# Patient Record
Sex: MALE | Race: WHITE
[De-identification: names, ages, dates, MRNs, and addresses within clinical notes are randomized per-mention and may not be internally consistent; named-entity substitution may affect disease eponyms.]

---

## 2019-09-19 ENCOUNTER — HOSPITAL ENCOUNTER (OUTPATIENT)
Dept: HOSPITAL 60 - LB.ED | Age: 44
Setting detail: OBSERVATION
LOS: 1 days | Discharge: HOME | End: 2019-09-20
Attending: FAMILY MEDICINE | Admitting: FAMILY MEDICINE
Payer: MEDICARE

## 2019-09-19 DIAGNOSIS — Z79.899: ICD-10-CM

## 2019-09-19 DIAGNOSIS — K52.9: Primary | ICD-10-CM

## 2019-09-19 DIAGNOSIS — D72.829: ICD-10-CM

## 2019-09-19 PROCEDURE — 96365 THER/PROPH/DIAG IV INF INIT: CPT

## 2019-09-19 PROCEDURE — 99285 EMERGENCY DEPT VISIT HI MDM: CPT

## 2019-09-19 PROCEDURE — 96375 TX/PRO/DX INJ NEW DRUG ADDON: CPT

## 2019-09-19 PROCEDURE — 96376 TX/PRO/DX INJ SAME DRUG ADON: CPT

## 2019-09-19 PROCEDURE — 85025 COMPLETE CBC W/AUTO DIFF WBC: CPT

## 2019-09-19 PROCEDURE — G0378 HOSPITAL OBSERVATION PER HR: HCPCS

## 2019-09-19 PROCEDURE — 96366 THER/PROPH/DIAG IV INF ADDON: CPT

## 2019-09-19 PROCEDURE — 80053 COMPREHEN METABOLIC PANEL: CPT

## 2019-09-19 PROCEDURE — 36415 COLL VENOUS BLD VENIPUNCTURE: CPT

## 2019-09-19 PROCEDURE — 74018 RADEX ABDOMEN 1 VIEW: CPT

## 2019-09-19 PROCEDURE — 74176 CT ABD & PELVIS W/O CONTRAST: CPT

## 2019-09-19 PROCEDURE — 96367 TX/PROPH/DG ADDL SEQ IV INF: CPT

## 2019-09-19 RX ADMIN — METRONIDAZOLE SCH MLS/HR: 500 SOLUTION INTRAVENOUS at 07:36

## 2019-09-19 RX ADMIN — METRONIDAZOLE SCH MLS/HR: 500 SOLUTION INTRAVENOUS at 23:38

## 2019-09-19 RX ADMIN — CIPROFLOXACIN SCH MLS/HR: 2 INJECTION, SOLUTION INTRAVENOUS at 08:42

## 2019-09-19 RX ADMIN — CIPROFLOXACIN SCH MLS/HR: 2 INJECTION, SOLUTION INTRAVENOUS at 20:37

## 2019-09-19 RX ADMIN — METRONIDAZOLE SCH MLS/HR: 500 SOLUTION INTRAVENOUS at 16:56

## 2019-09-19 NOTE — CT
DATE OF SERVICE:  09/19/19

CLINICAL DATA:  abdominal pain



UNENHANCED ABDOMEN AND PELVIC CT:



Multislice acquisition through the abdomen and pelvis without IV or oral 
contrast was performed. 



Comparison is made to a prior unenhanced abdomen and pelvic CT dated 08/22/19.



There is a linear density in the left lung base consistent with linear 
atelectasis or fibrosis.  The lung bases are otherwise clear.  



The unenhanced liver appears normal.  No focal hepatic lesions.  The 
gallbladder appears normal.



The spleen appears normal.  The pancreas appears normal.  The right and left 
adrenals appear normal. 



The right and left kidneys appear normal.  No nephrocalcinosis or 
nephrolithiasis.  No hydronephrosis or hydroureter.



The bladder is partially fluid-filled.  It appears normal. 



The prostate is mildly enlarged.  



There is fluid with scattered air-fluid levels throughout the colon.  It is not 
distended.  Colitis should be considered.  



The stomach is gas and debris-filled and moderately distended.  The possibility 
of a gastric outlet obstruction should be considered. 



No free air.  No free fluid.  No dilated loops of bowel.  No adenopathy.  No 
aortic aneurysm.  



[id number]
MTDD

## 2019-09-19 NOTE — EDM.PDOC
ED HPI GENERAL MEDICAL PROBLEM





- General


Chief Complaint: Abdominal Pain


Stated Complaint: ABDOMINAL PAIN


Time Seen by Provider: 09/19/19 05:15


Source of Information: Reports: Patient


History Limitations: Reports: No Limitations





- History of Present Illness


INITIAL COMMENTS - FREE TEXT/NARRATIVE: 


Pt is a 43 year old male presents to emergency room with severe abdominal pain 

which started at 2 AM today. Pt has his supper at 9 PM and went to bed.He woke 

up with severe abdominal pain around 2 am today and noted that his abdomen was 

bloated, so severe that he felt like his left lower quadrant hernia mesh was 

pushed. Since then he claims he has been burping very foul smelling burps on 

and off and feels nauseous. No vomiting. Pain has resolved since then 

gradually. Presently not in pain.





No fever or chills. No heart burn, or dyspepsia.





Pt claims that he has been fighting constipation for past 1 month now and he 

had taken bottle of  Mag citrate last night.





Onset: Today


Onset Date: 09/19/19


Onset Time: 02:00


Location: Reports: Abdomen


Quality: Reports: Ache


Severity: Moderate


Improves with: Reports: None


Worsens with: Reports: None


Associated Symptoms: Denies: Confusion, Chest Pain, Cough, Diaphoresis, Fever/

Chills, Headaches, Nausea/Vomiting, Rash, Seizure, Shortness of Breath, Syncope

, Weakness





- Related Data


 Allergies











Allergy/AdvReac Type Severity Reaction Status Date / Time


 


adalimumab [From Humira] Allergy  Dizziness Verified 09/19/19 05:12


 


atenolol Allergy  Swelling Verified 09/19/19 05:12


 


Iodinated Contrast Media Allergy  Anaphylactic Verified 09/19/19 05:12





   Shock  


 


doxycycline AdvReac  Nausea Verified 09/19/19 05:12


 


rofecoxib [From Vioxx] AdvReac  Mouth Sores Verified 09/19/19 05:12











Home Meds: 


 Home Meds





Albuterol [Ventolin HFA] 1 puff INH Q4HR PRN 12/03/16 [History]


Fluticasone Propionate 1 spray ROCKY DAILY PRN 12/03/16 [History]


Amitriptyline [Elavil] 20 mg PO QPM 12/06/18 [History]


Fluconazole [Diflucan] 200 mg PO ASDIRECTED 12/06/18 [History]


Folic Acid 1 mg PO DAILY 12/06/18 [History]


Levothyroxine Sodium [Levoxyl] 50 mcg PO ASDIRECTED 12/06/18 [History]


Montelukast Sodium 10 mg PO DAILY 12/06/18 [History]


Acetaminophen/HYDROcodone [Norco 325-10 MG] 1 tab PO Q4H PRN  tablet 12/08/18 [

Rx]


Cholecalciferol (Vitamin D3) [Vitamin D3] 1,000 unit PO DAILY 09/19/19 [History]


Cyanocobalamin (Vitamin B12) [Vitamin B12] 100 mcg PO DAILY 09/19/19 [History]


Diltiazem HCl [Dilt-XR] 120 mg PO DAILY 09/19/19 [History]


InFLIXimab [Remicade] 100 mg IV ASDIRECTED 09/19/19 [History]


Ketorolac [Toradol] 10 mg PO TID PRN 09/19/19 [History]


Methotrexate 0.6 ml IM WEEKLY 09/19/19 [History]


Omeprazole 40 mg PO DAILY 09/19/19 [History]


ZOLMitriptan [Zolmitriptan] 5 mg PO ASDIRECTED PRN 09/19/19 [History]


diphenhydrAMINE HCl [Benadryl] 50 mg PO Q8H PRN 09/19/19 [History]











Past Medical History


HEENT History: Reports: Impaired Vision, Sinusitis


Cardiovascular History: Reports: Afib, Other (See Below)


Other Cardiovascular History: Linx through [facility]


Respiratory History: Reports: Asthma, Pneumonia, Recurrent, SOB


Gastrointestinal History: Reports: GERD, Other (See Below)


Other Gastrointestinal History: hernia


Musculoskeletal History: Reports: Fracture


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  

fractured right orbital , shattered sinus cavity, broken ribs


Neurological History: Reports: Concussion, Migraines


Endocrine/Metabolic History: Reports: Hypothyroidism, Vitamin D Deficiency


Other Endocrine/Metabolic History: partial thyroidectomy due to cancer


Oncologic (Cancer) History: Reports: None, Thyroid, Other (See Below)


Other Oncologic History: knee


Dermatologic History: Reports: Other (See Below)


Other Dermatologic History: rash chronic





- Infectious Disease History


Infectious Disease History: Reports: Chicken Pox





- Past Surgical History


HEENT Surgical History: Reports: Adenoidectomy, Tonsillectomy, Other (See Below)


Other HEENT Surgeries/Procedures: [place]


Cardiovascular Surgical History: Reports: None


GI Surgical History: Reports: Hernia, Inguinal, Other (See Below)


Other GI Surgeries/Procedures: Ulcerative colitis


Endocrine Surgical History: Reports: Thyroidectomy


Neurological Surgical History: Reports: None


Musculoskeletal Surgical History: Reports: Other (See Below)


Other Musculoskeletal Surgeries/Procedures:: tumers removed from growth plate 

left knee


Oncologic Surgical History: Reports: None





Social & Family History





- Family History


Family Medical History: Noncontributory





- Caffeine Use


Caffeine Use: Reports: Coffee





ED ROS GENERAL





- Review of Systems


Review Of Systems: See Below


Constitutional: Denies: Fever, Chills, Weakness


HEENT: Denies: Ear Pain, Rhinitis, Throat Pain


Respiratory: Denies: Shortness of Breath, Cough, Sputum


Cardiovascular: Denies: Chest Pain, Lightheadedness


GI/Abdominal: Reports: Abdominal Pain, Constipation, Distension, Nausea.  Denies

: Black Stool, Hematemesis, Hematochezia, Vomiting


: Denies: Dysuria, Flank Pain, Frequency


Musculoskeletal: Denies: Joint Pain, Joint Swelling


Skin: Denies: Bruising, Pruritis, Rash





ED EXAM, GENERAL





- Physical Exam


Exam: See Below


Exam Limited By: No Limitations


General Appearance: Alert, WD/WN, No Apparent Distress


Eye Exam: Bilateral Eye: EOMI, PERRL


Ears: Normal External Exam, Normal Canal, Hearing Grossly Normal, Normal TMs


Ear Exam: Bilateral Ear: Auricle Normal, Canal Normal, TM normal


Nose: Normal Inspection, Normal Mucosa, No Blood


Throat/Mouth: Normal Inspection, Normal Lips, Normal Teeth, Normal Gums, Normal 

Oropharynx, Normal Voice, No Airway Compromise


Head: Atraumatic, Normocephalic


Neck: Normal Inspection, Supple, Non-Tender, Full Range of Motion


Respiratory/Chest: No Respiratory Distress, Lungs Clear, Normal Breath Sounds, 

No Accessory Muscle Use, Chest Non-Tender


Cardiovascular: Normal Peripheral Pulses, Regular Rate, Rhythm, No Edema, No 

Gallop, No JVD, No Murmur, No Rub


GI/Abdominal: Non-Tender, Distended (in the upper abdomen), Abnormal Bowel 

Sounds (hypoactive BS).  No: Guarding, Rigid, Rebound, Mass


Back Exam: Normal Inspection, Full Range of Motion, NT


Extremities: Normal Inspection, Normal Range of Motion, Non-Tender, Normal 

Capillary Refill, No Pedal Edema


Neurological: Alert, Oriented, CN II-XII Intact, Normal Cognition, Normal Gait, 

Normal Reflexes, No Motor/Sensory Deficits





Course





- Vital Signs


Text/Narrative:: 


In the emergency room, patient is asymptomatic. His Vitals are stable. On exam, 

there is upper abdominal distension.Abdomen is non-tender presently. No LLQ 

tenderness.  He does have slightly hypoactive bowel sounds. I did get CBC, CMP 

and CT abdomen without contrast to rule out bowel obstruction V/s 

diverticulitis.





Pt's CBC is elevated at 19.6 with 89% neutrophils. His CMP is normal. His CT 

abdomen shows prominent stomach with gastric retention of food content. Patient 

claims his last meal was at 9 PM. There is food still present in the stomach 

after 10 hrs. Also he claims he has been having foul smelling belches. He might 

have some gastric motility disorder too,but, this might be new or non-specific.





CT shows possible gastroenteritis with colonic air fluid levels,  OR might have 

early colonic obstruction too. Cannot rule out. Hence I have admitted patient 

to hospital for observation. Will start him on cipro and flagyl IV as his white 

count is elevated.  He did receive reglan 10mg. Did try to place NG tube to 

decompress stomach, but patient has had DNS and attempt failed. As patient is 

asymptomatic, will keep him NPO.





Will monitor him in the hospital  over the day. Repeat Abdominal X-ray upright 

and CBC in the evening. If the air fluid levels have resolved and if he is 

feeling better, will discharge him today evening.





Last Recorded V/S: 


 Last Vital Signs











Temp  98.2 F   09/19/19 05:14


 


Pulse  83   09/19/19 05:14


 


Resp  16   09/19/19 05:14


 


BP  135/99 H  09/19/19 05:14


 


Pulse Ox  98   09/19/19 05:14














- Orders/Labs/Meds


Orders: 


 Active Orders 24 hr











 Category Date Time Status


 


 Patient Status [ADT] Routine ADT  09/19/19 06:52 Ordered


 


 Height and Weight [RC] UPON Care  09/19/19 06:52 Ordered


 


 Intake and Output [RC] QSHIFT Care  09/19/19 06:53 Ordered


 


 Oxygen Therapy [RC] PRN Care  09/19/19 06:52 Ordered


 


 VTE/DVT Education [RC] Per Unit Routine Care  09/19/19 06:52 Ordered


 


 Vital Signs [RC] Q4H Care  09/19/19 06:52 Ordered


 


 Nothing per Oral Now Diet [DIET] Diet  09/19/19 Breakfast Ordered


 


 Abdomen Pelvis wo Cont [CT] Stat Exams  09/19/19 05:36 Taken


 


 CBC WITH AUTO DIFF [HEME] Routine Lab  09/19/19 16:52 Ordered


 


 Ciprofloxacin in D5W [Cipro in D5W 400 MG/200 ML] 400 Med  09/19/19 08:00 

Ordered





 mg   





 Premix Bag 1 bag   





 IV Q12HR   


 


 Metoclopramide [Reglan] Med  09/19/19 07:00 Once





 10 mg IV ONETIME ONE   


 


 Sodium Chloride 0.9% @ 125 MLS/HR (1000ml) Med  09/19/19 07:00 Ordered





 Sodium Chloride 0.9% [Normal Saline] 1,000 ml   





 IV ASDIRECTED   


 


 Sodium Chloride 0.9% [Saline Flush] Med  09/19/19 06:52 Ordered





 10 ml FLUSH ASDIRECTED PRN   


 


 metroNIDAZOLE/Normal Saline [Flagyl 500 MG in  ML Med  09/19/19 07:00 

Ordered





 ] 500 mg   





 Premix Bag 1 bag   





 IV Q8H   


 


 Peripheral IV Insertion Adult [OM.PC] Routine Oth  09/19/19 06:52 Ordered


 


 Resuscitation Status Routine Resus Stat  09/19/19 06:52 Ordered











Labs: 


 Laboratory Tests











  09/19/19 09/19/19 Range/Units





  05:45 05:45 


 


WBC  19.7 H D   (4.0-11.0)  K/uL


 


RBC  4.95   (4.50-6.50)  M/uL


 


Hgb  15.2   (13.0-18.0)  g/dL


 


Hct  44.4   (40.0-54.0)  %


 


MCV  90   (76-96)  fL


 


MCH  30.7   (27.0-32.0)  pg


 


MCHC  34.2   (31.0-35.0)  g/dL


 


RDW  14.0   (11.0-16.0)  %


 


Plt Count  274   (150-400)  K/uL


 


MPV  10.6 H   (6.0-10.0)  fL


 


Neut % (Auto)  89.0 H   (45.0-70.0)  %


 


Lymph % (Auto)  5.2 L   (20.0-40.0)  %


 


Mono % (Auto)  5.7   (3.0-10.0)  %


 


Eos % (Auto)  0.0 L   (1.0-5.0)  %


 


Baso % (Auto)  0.1   (0.0-0.5)  %


 


Neut # (Auto)  17.54 H   (2.00-7.50)  K/uL


 


Lymph # (Auto)  1.02 L   (1.50-4.00)  K/uL


 


Mono # (Auto)  1.12 H   (0.20-0.80)  K/uL


 


Eos # (Auto)  0.00 L   (0.04-0.40)  K/uL


 


Baso # (Auto)  0.01 L   (0.02-0.10)  K/uL


 


Sodium   143  (136-145)  mmol/L


 


Potassium   3.7  (3.5-5.1)  mmol/L


 


Chloride   108 H  ()  mmol/L


 


Carbon Dioxide   23.7  D  (21.0-32.0)  mmol/L


 


Anion Gap   15.0  (5.0-15.0)  mmol/L


 


BUN   20  D  (8-26)  mg/dL


 


Creatinine   0.87  (0.70-1.30)  mg/dL


 


Est Cr Clr Drug Dosing   TNP  


 


Estimated GFR (MDRD)   > 60  (>60)  MLS/MIN


 


BUN/Creatinine Ratio   23.0  (6-25)  


 


Glucose   127 H  ()  mg/dL


 


Calcium   8.6  (8.5-10.1)  mg/dL


 


Total Bilirubin   0.5  (0.0-1.0)  mg/dL


 


AST   12 L  (15-37)  U/L


 


ALT   22  (12-78)  U/L


 


Alkaline Phosphatase   60  ()  U/L


 


Total Protein   6.1 L  (6.4-8.2)  g/dL


 


Albumin   3.4  (3.4-5.0)  g/dL


 


Globulin   2.7  (2.2-4.2)  g/dL


 


Albumin/Globulin Ratio   1.3  (0.8-2.0)  














Departure





- Departure


Time of Disposition: 07:00


Disposition: Refer to Observation


Condition: Fair


Clinical Impression: 


 Leucocytosis, Gastroenteritis








- Discharge Information


*PRESCRIPTION DRUG MONITORING PROGRAM REVIEWED*: Not Applicable


*COPY OF PRESCRIPTION DRUG MONITORING REPORT IN PATIENT TONI: Not Applicable


Referrals: 


PCP,None [Primary Care Provider] - 


Forms:  ED Department Discharge, ED Return to Work/School Form





- Problem List & Annotations


(1) Gastroenteritis


SNOMED Code(s): 98202337


   Code(s): K52.9 - NONINFECTIVE GASTROENTERITIS AND COLITIS, UNSPECIFIED   

Status: Acute   Current Visit: Yes   





(2) Leucocytosis


SNOMED Code(s): 920022406, 012552140


   Code(s): D72.829 - ELEVATED WHITE BLOOD CELL COUNT, UNSPECIFIED   Status: 

Acute   Current Visit: Yes   





- Problem List Review


Problem List Initiated/Reviewed/Updated: Yes





- My Orders


Last 24 Hours: 


My Active Orders





09/19/19 05:36


Abdomen Pelvis wo Cont [CT] Stat 





09/19/19 06:52


Patient Status [ADT] Routine 


Height and Weight [RC] UPON 


Oxygen Therapy [RC] PRN 


VTE/DVT Education [RC] Per Unit Routine 


Vital Signs [RC] Q4H 


Sodium Chloride 0.9% [Saline Flush]   10 ml FLUSH ASDIRECTED PRN 


Peripheral IV Insertion Adult [OM.PC] Routine 


Resuscitation Status Routine 





09/19/19 06:53


Intake and Output [RC] QSHIFT 





09/19/19 07:00


Metoclopramide [Reglan]   10 mg IV ONETIME ONE 


Sodium Chloride 0.9% @ 125 MLS/HR (1000ml) Sodium Chloride 0.9% [Normal Saline] 

1,000 ml IV ASDIRECTED 


metroNIDAZOLE/Normal Saline [Flagyl 500 MG in  ML] 500 mg   Premix Bag 1 

bag IV Q8H 





09/19/19 08:00


Ciprofloxacin in D5W [Cipro in D5W 400 MG/200 ML] 400 mg   Premix Bag 1 bag IV 

Q12HR 





09/19/19 16:52


CBC WITH AUTO DIFF [HEME] Routine 





09/19/19 Breakfast


Nothing per Oral Now Diet [DIET] 














- Assessment/Plan


Last 24 Hours: 


My Active Orders





09/19/19 05:36


Abdomen Pelvis wo Cont [CT] Stat 





09/19/19 06:52


Patient Status [ADT] Routine 


Height and Weight [RC] UPON 


Oxygen Therapy [RC] PRN 


VTE/DVT Education [RC] Per Unit Routine 


Vital Signs [RC] Q4H 


Sodium Chloride 0.9% [Saline Flush]   10 ml FLUSH ASDIRECTED PRN 


Peripheral IV Insertion Adult [OM.PC] Routine 


Resuscitation Status Routine 





09/19/19 06:53


Intake and Output [RC] QSHIFT 





09/19/19 07:00


Metoclopramide [Reglan]   10 mg IV ONETIME ONE 


Sodium Chloride 0.9% @ 125 MLS/HR (1000ml) Sodium Chloride 0.9% [Normal Saline] 

1,000 ml IV ASDIRECTED 


metroNIDAZOLE/Normal Saline [Flagyl 500 MG in  ML] 500 mg   Premix Bag 1 

bag IV Q8H 





09/19/19 08:00


Ciprofloxacin in D5W [Cipro in D5W 400 MG/200 ML] 400 mg   Premix Bag 1 bag IV 

Q12HR 





09/19/19 16:52


CBC WITH AUTO DIFF [HEME] Routine 





09/19/19 Breakfast


Nothing per Oral Now Diet [DIET] 











Assessment:: 


Acute abdominal pain


Gastroenteritis with leucocytosis





Plan: 


In the emergency room, patient is asymptomatic. His Vitals are stable. On exam, 

there is upper abdominal distension.Abdomen is non-tender presently. No LLQ 

tenderness.  He does have slightly hypoactive bowel sounds. I did get CBC, CMP 

and CT abdomen without contrast to rule out bowel obstruction V/s 

diverticulitis.





Pt's CBC is elevated at 19.6 with 89% neutrophils. His CMP is normal. His CT 

abdomen shows prominent stomach with gastric retention of food content. Patient 

claims his last meal was at 9 PM. There is food still present in the stomach 

after 10 hrs. Also he claims he has been having foul smelling belches. He might 

have some gastric motility disorder too,but, this might be new or non-specific.





CT shows possible gastroenteritis with colonic air fluid levels,  OR might have 

early colonic obstruction too. Cannot rule out. Hence I have admitted patient 

to hospital for observation. Will start him on cipro and flagyl IV as his white 

count is elevated.  He did receive reglan 10mg. Did try to place NG tube to 

decompress stomach, but patient has had DNS and attempt failed. As patient is 

asymptomatic, will keep him NPO.





Will monitor him in the hospital  over the day. Repeat Abdominal X-ray upright 

and CBC in the evening. If the air fluid levels have resolved and if he is 

feeling better, will discharge him today evening.

## 2019-09-19 NOTE — PCM.DCSUM1
**Discharge Summary





- Hospital Course


Free Text/Narrative:: 


Pt presented with acute upper abdominal pain with distension. His Workup showed 

gastroenteritis with air fluid level in the colon, with leucocytosis of 19.6 K. 

Hence patient was admitted to monitor closely t make sure he was developing 

bowel obstruction or acute abdominal infection.





Pt was kept NPO and started in IV NS at 125cc/hr. Pt has had 3 episodes of 

small loose watery stools. He claims he is feeling better. No more nausea or 

vomiting since admission. HE is feeling hungry.





The repeat Abdominal Xray upright is negative for air fluids levels. His CBC 

shows white count of .





Pt reassured that he basically has gastroenteritis. His IV fluids have been 

discontinued. Discharged home of oral cipro 500mg BID and flagyl 500mg BID for 

1 wk. Advised soft diet. Avoid meat and mild products until loose stools 

resolve.





Brief History: Pt presented to emergency room with sudden onset of severe upper 

abdominal pain. Pt was admitted for close monitoring of his symptoms. Kindly 

see H&P for details.


Diagnosis: Stroke: No





- Discharge Data


Discharge Date: 09/19/19


Discharge Disposition: Home, Self-Care 01


Condition: Good





- Referral to Home Health


Primary Care Physician: 


PCP None








- Discharge Diagnosis/Problem(s)


(1) Gastroenteritis


SNOMED Code(s): 52578945


   ICD Code: K52.9 - NONINFECTIVE GASTROENTERITIS AND COLITIS, UNSPECIFIED   

Status: Acute   Current Visit: Yes   





(2) Leucocytosis


SNOMED Code(s): 868983191, 480343962


   ICD Code: D72.829 - ELEVATED WHITE BLOOD CELL COUNT, UNSPECIFIED   Status: 

Acute   Current Visit: Yes   





- Patient Instructions


Diet: GI Soft/Low Residue/Low Fiber


Fluid Restriction: 1500 mL


Activity: As Tolerated


Driving: [address] Today


Showering/Bathing: May Shower





- Discharge Plan


*PRESCRIPTION DRUG MONITORING PROGRAM REVIEWED*: Not Applicable


*COPY OF PRESCRIPTION DRUG MONITORING REPORT IN PATIENT TONI: Not Applicable


Home Medications: 


 Home Meds





Albuterol [Ventolin HFA] 1 puff INH Q4HR PRN 12/03/16 [History]


Fluticasone Propionate 1 spray ROCKY DAILY PRN 12/03/16 [History]


Amitriptyline [Elavil] 20 mg PO QPM 12/06/18 [History]


Fluconazole [Diflucan] 200 mg PO ASDIRECTED 12/06/18 [History]


Folic Acid 1 mg PO DAILY 12/06/18 [History]


Levothyroxine Sodium [Levoxyl] 50 mcg PO ASDIRECTED 12/06/18 [History]


Montelukast Sodium 10 mg PO DAILY 12/06/18 [History]


Acetaminophen/HYDROcodone [Norco 325-10 MG] 1 tab PO Q4H PRN  tablet 12/08/18 [

Rx]


Cholecalciferol (Vitamin D3) [Vitamin D3] 1,000 unit PO DAILY 09/19/19 [History]


Cyanocobalamin (Vitamin B12) [Vitamin B12] 100 mcg PO DAILY 09/19/19 [History]


Diltiazem HCl [Dilt-XR] 120 mg PO DAILY 09/19/19 [History]


InFLIXimab [Remicade] 100 mg IV ASDIRECTED 09/19/19 [History]


Ketorolac [Toradol] 10 mg PO TID PRN 09/19/19 [History]


Methotrexate 0.6 ml IM WEEKLY 09/19/19 [History]


Omeprazole 40 mg PO DAILY 09/19/19 [History]


ZOLMitriptan [Zolmitriptan] 5 mg PO ASDIRECTED PRN 09/19/19 [History]


diphenhydrAMINE HCl [Benadryl] 50 mg PO Q8H PRN 09/19/19 [History]








Forms:  ED Department Discharge, ED Return to Work/School Form


Referrals: 


PCP,None [Primary Care Provider] - 





- Discharge Summary/Plan Comment


DC Time >30 min.: Yes


Discharge Summary/Plan Comment: 


Pt reassured that he basically has gastroenteritis.


 His IV fluids have been discontinued.


 Discharged home of oral cipro 500mg BID and flagyl 500mg BID for 1 wk.


 Advised soft diet.


 Avoid meat and mild products until loose stools resolve.


Followup with primary care provider early next week for recheck.











- General Info


Date of Service: 09/19/19


Functional Status: Reports: Pain Controlled, Tolerating Diet, Urinating





- Review of Systems


General: Denies: Fever, Weakness, Fatigue


HEENT: Denies: Sore Throat, Rhinitis


Pulmonary: Denies: Cough, Sputum


Cardiovascular: Denies: Chest Pain, Lightheadedness


Gastrointestinal: Reports: Diarrhea.  Denies: Abdominal Pain, Constipation, 

Nausea, Vomiting


Genitourinary: Denies: Frequency, Pain


Musculoskeletal: Denies: Joint Pain, Joint Swelling


Skin: Denies: Bruising, Pruritis, Rash





- Patient Data


Vitals - Most Recent: 


 Last Vital Signs











Temp  98.6 F   09/19/19 14:52


 


Pulse  72   09/19/19 14:52


 


Resp  16   09/19/19 14:52


 


BP  134/83   09/19/19 14:52


 


Pulse Ox  100   09/19/19 14:52











Weight - Most Recent: 74.571 kg


Lab Results - Last 24 hrs: 


 Laboratory Results - last 24 hr











  09/19/19 09/19/19 Range/Units





  05:45 05:45 


 


WBC  19.7 H D   (4.0-11.0)  K/uL


 


RBC  4.95   (4.50-6.50)  M/uL


 


Hgb  15.2   (13.0-18.0)  g/dL


 


Hct  44.4   (40.0-54.0)  %


 


MCV  90   (76-96)  fL


 


MCH  30.7   (27.0-32.0)  pg


 


MCHC  34.2   (31.0-35.0)  g/dL


 


RDW  14.0   (11.0-16.0)  %


 


Plt Count  274   (150-400)  K/uL


 


MPV  10.6 H   (6.0-10.0)  fL


 


Neut % (Auto)  89.0 H   (45.0-70.0)  %


 


Lymph % (Auto)  5.2 L   (20.0-40.0)  %


 


Mono % (Auto)  5.7   (3.0-10.0)  %


 


Eos % (Auto)  0.0 L   (1.0-5.0)  %


 


Baso % (Auto)  0.1   (0.0-0.5)  %


 


Neut # (Auto)  17.54 H   (2.00-7.50)  K/uL


 


Lymph # (Auto)  1.02 L   (1.50-4.00)  K/uL


 


Mono # (Auto)  1.12 H   (0.20-0.80)  K/uL


 


Eos # (Auto)  0.00 L   (0.04-0.40)  K/uL


 


Baso # (Auto)  0.01 L   (0.02-0.10)  K/uL


 


Sodium   143  (136-145)  mmol/L


 


Potassium   3.7  (3.5-5.1)  mmol/L


 


Chloride   108 H  ()  mmol/L


 


Carbon Dioxide   23.7  D  (21.0-32.0)  mmol/L


 


Anion Gap   15.0  (5.0-15.0)  mmol/L


 


BUN   20  D  (8-26)  mg/dL


 


Creatinine   0.87  (0.70-1.30)  mg/dL


 


Est Cr Clr Drug Dosing   TNP  


 


Estimated GFR (MDRD)   > 60  (>60)  MLS/MIN


 


BUN/Creatinine Ratio   23.0  (6-25)  


 


Glucose   127 H  ()  mg/dL


 


Calcium   8.6  (8.5-10.1)  mg/dL


 


Total Bilirubin   0.5  (0.0-1.0)  mg/dL


 


AST   12 L  (15-37)  U/L


 


ALT   22  (12-78)  U/L


 


Alkaline Phosphatase   60  ()  U/L


 


Total Protein   6.1 L  (6.4-8.2)  g/dL


 


Albumin   3.4  (3.4-5.0)  g/dL


 


Globulin   2.7  (2.2-4.2)  g/dL


 


Albumin/Globulin Ratio   1.3  (0.8-2.0)  











Med Orders - Current: 


 Current Medications





Ciprofloxacin/Dextrose 400 mg/ (Premix)  200 mls @ 200 mls/hr IV Q12HR [facility]


   Last Admin: 09/19/19 08:42 Dose:  200 mls/hr


Metronidazole 500 mg/ Premix  100 mls @ 100 mls/hr IV Q8H [facility]


   Last Admin: 09/19/19 07:36 Dose:  100 mls/hr


Sodium Chloride (Normal Saline)  1,000 mls @ 125 mls/hr IV ASDIRECTED [facility]


Sodium Chloride (Saline Flush)  10 ml FLUSH ASDIRECTED PRN


   PRN Reason: Keep Vein Open





Discontinued Medications





Metronidazole (Flagyl 500 Mg In Ns 100 Ml) Confirm Administered Dose 100 mls @ 

as directed .ROUTE .[facility] ONE


   Stop: 09/19/19 07:21


Ciprofloxacin/Dextrose (Cipro In D5w 400 Mg/200 Ml) Confirm Administered Dose 

200 mls @ as directed .ROUTE .[facility]


   Stop: 09/19/19 07:21


Metoclopramide HCl (Reglan)  10 mg IV ONETIME ONE


   Stop: 09/19/19 07:01


   Last Admin: 09/19/19 07:36 Dose:  10 mg











- Exam


General: Reports: Alert, Oriented


HEENT: Reports: Pupils Equal, Pupils Reactive, EOMI, Mucous Membr. Moist/Pink


Neck: Reports: Supple


Lungs: Reports: Clear to Auscultation, Normal Respiratory Effort


Cardiovascular: Reports: Regular Rate, Regular Rhythm


GI/Abdominal Exam: Normal Bowel Sounds, Soft, Non-Tender, No Organomegaly, No 

Distention, No Abnormal Bruit, No Mass, Pelvis Stable


Back Exam: Reports: Normal Inspection, Full Range of Motion


Extremities: Normal Inspection, Normal Range of Motion, Non-Tender, No Pedal 

Edema, Normal Capillary Refill

## 2019-09-20 VITALS — SYSTOLIC BLOOD PRESSURE: 129 MMHG | HEART RATE: 59 BPM | DIASTOLIC BLOOD PRESSURE: 78 MMHG

## 2019-09-20 RX ADMIN — METRONIDAZOLE SCH MLS/HR: 500 SOLUTION INTRAVENOUS at 07:43

## 2019-09-20 RX ADMIN — CIPROFLOXACIN SCH: 2 INJECTION, SOLUTION INTRAVENOUS at 09:30

## 2019-09-20 NOTE — CR
DATE OF SERVICE:  09/19/19

CLINICAL DATA:  followup from morning CT



SUPINE AND UPRIGHT ABDOMEN:  



No free air.  



There are scattered air-fluid levels in the right colon and distal small bowel.
  Enterocolitis should be considered.  No distended loops of bowel. 



[id number]
MTDD

## 2019-09-20 NOTE — PCM.DCSUM1
**Discharge Summary





- Hospital Course


Free Text/Narrative:: 


Pt presented to emergency room on 9/19/19 early morning with abdominal pain. On 

workup his White count was elevated at 19K and also his CT abdomen showed 

colonic air fluids levels. As patient has elevated white count with distended 

abdomen and pain, was admitted for monitoring and to make sure he is not 

getting into early bowel obstruction.





Pt was kept NPO and started on IV NS at 125cc/hr. Also for his elevated white 

count, i did start him on cipro 500mg BID and flagyl 400mg TID IV.HE did 

receive reglan 10mg IV on admission as he had gastric retention of food 

material. Pt had uneventful day, and later half of the day had 3 bouts of 

semiformed stool. On the evening of day1 as he was feeing better, I did repeat 

his white count which was up from 19K to 24 K. Hence patient was continued on 

Iv antibiotics for 24 hrs.Also patient was started on clear liquids in the 

evening and monitored over night.








Day 2 : pt is feeling better. No abdominal pain. Clinical exam is normal. 

Vitals stable. His white count is down to 10K today. As patient has been 

tolerating oral diet and feeling better and his white count is back to normal, 

patient has been planned for discharge.





Advised flagyl 500mg BID and Cipro 500mg BID for next 6 days. Avoid mild and 

meat products until done with antibiotics. Plenty of fluids and rest.





Followup in clinic for recheck next week.





Brief History: Presented to ER with severe abdominal pain. His white count was 

elevated and has air fluid level in colon. Admitted for observation. Kindly see 

H&P for details.


Diagnosis: Stroke: No





- Discharge Data


Discharge Date: 09/20/19


Discharge Disposition: Home, Self-Care 01


Condition: Good





- Referral to Home Health


Primary Care Physician: 


PCP None








- Discharge Diagnosis/Problem(s)


(1) Gastroenteritis


SNOMED Code(s): 20627984


   ICD Code: K52.9 - NONINFECTIVE GASTROENTERITIS AND COLITIS, UNSPECIFIED   

Status: Acute   





(2) Leucocytosis


SNOMED Code(s): 239570269, [id number]


   ICD Code: D72.829 - ELEVATED WHITE BLOOD CELL COUNT, UNSPECIFIED   Status: 

Acute   





- Patient Instructions


Diet: GI Soft/Low Residue/Low Fiber


Fluid Restriction: 1500 mL


Activity: As Tolerated


Driving: [address] Today





- Discharge Plan


*PRESCRIPTION DRUG MONITORING PROGRAM REVIEWED*: Not Applicable


*COPY OF PRESCRIPTION DRUG MONITORING REPORT IN PATIENT TONI: Not Applicable


Home Medications: 


 Home Meds





Albuterol [Ventolin HFA] 1 puff INH Q4HR PRN 12/03/16 [History]


Fluticasone Propionate 1 spray ROCKY DAILY PRN 12/03/16 [History]


Amitriptyline [Elavil] 20 mg PO QPM 12/06/18 [History]


Fluconazole [Diflucan] 200 mg PO ASDIRECTED 12/06/18 [History]


Folic Acid 1 mg PO DAILY 12/06/18 [History]


Levothyroxine Sodium [Levoxyl] 50 mcg PO ASDIRECTED 12/06/18 [History]


Montelukast Sodium 10 mg PO DAILY 12/06/18 [History]


Acetaminophen/HYDROcodone [Norco 325-10 MG] 1 tab PO Q4H PRN  tablet 12/08/18 [

Rx]


Cholecalciferol (Vitamin D3) [Vitamin D3] 1,000 unit PO DAILY 09/19/19 [History]


Cyanocobalamin (Vitamin B12) [Vitamin B12] 100 mcg PO DAILY 09/19/19 [History]


Diltiazem HCl [Dilt-XR] 120 mg PO DAILY 09/19/19 [History]


InFLIXimab [Remicade] 100 mg IV ASDIRECTED 09/19/19 [History]


Ketorolac [Toradol] 10 mg PO TID PRN 09/19/19 [History]


Methotrexate 0.6 ml IM WEEKLY 09/19/19 [History]


Omeprazole 40 mg PO DAILY 09/19/19 [History]


ZOLMitriptan [Zolmitriptan] 5 mg PO ASDIRECTED PRN 09/19/19 [History]


diphenhydrAMINE HCl [Benadryl] 50 mg PO Q8H PRN 09/19/19 [History]








Patient Handouts:  Ciprofloxacin tablets, Metronidazole tablets or capsules


Forms:  ED Department Discharge, ED Return to Work/School Form


Referrals: 


PCP,None [Primary Care Provider] - 





- Discharge Summary/Plan Comment


DC Time >30 min.: Yes


Discharge Summary/Plan Comment: 


Advised flagyl 500mg BID and Cipro 500mg BID for next 6 days. Avoid mild and 

meat products until done with antibiotics. Plenty of fluids and rest.





Followup in clinic for recheck next week.











- General Info


Date of Service: 09/20/19


Functional Status: Reports: Pain Controlled, Tolerating Diet, Ambulating, 

Urinating





- Review of Systems


General: Denies: Fever, Weakness


HEENT: Denies: Sinus Congestion, Rhinitis


Pulmonary: Denies: Sputum, Hemoptysis


Cardiovascular: Denies: Chest Pain, Lightheadedness


Gastrointestinal: Denies: Abdominal Pain, Difficulty Swallowing, Nausea, 

Vomiting


Genitourinary: Denies: Dysuria, Frequency


Musculoskeletal: Denies: Joint Pain, Joint Swelling


Skin: Denies: Bruising, Pruritis, Rash


Neurological: Denies: Headache, Numbness


Psychiatric: Denies: Depression





- Patient Data


Vitals - Most Recent: 


 Last Vital Signs











Temp  98.1 F   09/20/19 08:00


 


Pulse  59 L  09/20/19 08:00


 


Resp  16   09/20/19 03:33


 


BP  129/78   09/20/19 08:00


 


Pulse Ox  98   09/20/19 08:00











Weight - Most Recent: 70.307 kg


I&O - Last 24 hours: 


 Intake & Output











 09/20/19 09/20/19 09/20/19





 06:59 14:59 22:59


 


Intake Total 2281  


 


Output Total 1950  


 


Balance 331  











Lab Results - Last 24 hrs: 


 Laboratory Results - last 24 hr











  09/19/19 09/20/19 Range/Units





  16:10 07:45 


 


WBC  24.4 H* D  10.6  D  (4.0-11.0)  K/uL


 


RBC  4.70  4.48 L  (4.50-6.50)  M/uL


 


Hgb  14.4  13.7  (13.0-18.0)  g/dL


 


Hct  42.9  41.4  (40.0-54.0)  %


 


MCV  91  92  (76-96)  fL


 


MCH  30.6  30.6  (27.0-32.0)  pg


 


MCHC  33.6  33.1  (31.0-35.0)  g/dL


 


RDW  13.9  13.9  (11.0-16.0)  %


 


Plt Count  236  215  (150-400)  K/uL


 


MPV  10.4 H  10.2 H  (6.0-10.0)  fL


 


Neut % (Auto)  88.4 H  75.3 H  (45.0-70.0)  %


 


Lymph % (Auto)  5.7 L  16.0 L  (20.0-40.0)  %


 


Mono % (Auto)  4.9  7.7  (3.0-10.0)  %


 


Eos % (Auto)  0.9 L  0.9 L  (1.0-5.0)  %


 


Baso % (Auto)  0.1  0.1  (0.0-0.5)  %


 


Neut # (Auto)  21.59 H  8.01 H  (2.00-7.50)  K/uL


 


Lymph # (Auto)  1.39 L  1.70  (1.50-4.00)  K/uL


 


Mono # (Auto)  1.19 H  0.82 H  (0.20-0.80)  K/uL


 


Eos # (Auto)  0.21  0.10  (0.04-0.40)  K/uL


 


Baso # (Auto)  0.02  0.01 L  (0.02-0.10)  K/uL











Med Orders - Current: 


 Current Medications








Discontinued Medications





Ciprofloxacin/Dextrose 400 mg/ (Premix)  200 mls @ 200 mls/hr IV Q12HR [facility]


   Last Admin: 09/20/19 09:30 Dose:  Not Given


Metronidazole 500 mg/ Premix  100 mls @ 100 mls/hr IV Q8H [facility]


   Last Admin: 09/20/19 07:43 Dose:  100 mls/hr


Sodium Chloride (Normal Saline)  1,000 mls @ 125 mls/hr IV ASDIRECTED [facility]


   Last Admin: 09/20/19 03:40 Dose:  125 mls/hr


Metronidazole (Flagyl 500 Mg In Ns 100 Ml) Confirm Administered Dose 100 mls @ 

as directed .ROUTE .[facility] ONE


   Stop: 09/19/19 07:21


   Last Admin: 09/19/19 20:04 Dose:  Not Given


Ciprofloxacin/Dextrose (Cipro In D5w 400 Mg/200 Ml) Confirm Administered Dose 

200 mls @ as directed .ROUTE .[facility] ONE


   Stop: 09/19/19 07:21


   Last Admin: 09/19/19 20:04 Dose:  Not Given


Metronidazole (Flagyl 500 Mg In Ns 100 Ml) Confirm Administered Dose 100 mls @ 

as directed .ROUTE .[facility] ONE


   Stop: 09/19/19 16:54


   Last Admin: 09/19/19 17:11 Dose:  Not Given


Ciprofloxacin/Dextrose (Cipro In D5w 400 Mg/200 Ml) Confirm Administered Dose 

200 mls @ as directed .ROUTE .[facility] ONE


   Stop: 09/19/19 20:29


   Last Admin: 09/19/19 20:37 Dose:  Not Given


Metronidazole (Flagyl 500 Mg In Ns 100 Ml) Confirm Administered Dose 100 mls @ 

as directed .ROUTE .[facility] ONE


   Stop: 09/19/19 22:41


   Last Admin: 09/19/19 22:47 Dose:  Not Given


Metronidazole (Flagyl 500 Mg In Ns 100 Ml) Confirm Administered Dose 100 mls @ 

as directed .ROUTE .[facility] ONE


   Stop: 09/20/19 07:39


   Last Admin: 09/20/19 07:42 Dose:  Not Given


Metoclopramide HCl (Reglan)  10 mg IV ONETIME ONE


   Stop: 09/19/19 07:01


   Last Admin: 09/19/19 07:36 Dose:  10 mg


Ondansetron HCl (Zofran)  4 mg IVPUSH Q8H [facility]


   Last Admin: 09/19/19 21:07 Dose:  Not Given


Ondansetron HCl (Zofran)  4 mg IVPUSH Q8H PRN


   PRN Reason: Nausea/Vomiting


   Last Admin: 09/19/19 21:34 Dose:  4 mg


Sodium Chloride (Saline Flush)  10 ml FLUSH ASDIRECTED PRN


   PRN Reason: Keep Vein Open











- Exam


General: Reports: Alert, Oriented


HEENT: Reports: Pupils Equal, Pupils Reactive, EOMI, Mucous Membr. Moist/Pink


Neck: Reports: Supple


Lungs: Reports: Clear to Auscultation, Normal Respiratory Effort


Cardiovascular: Reports: Regular Rate, Regular Rhythm


GI/Abdominal Exam: Normal Bowel Sounds, Soft, Non-Tender, No Organomegaly, No 

Distention, No Abnormal Bruit, No Mass, Pelvis Stable


Extremities: Normal Inspection, Normal Range of Motion, Non-Tender, No Pedal 

Edema, Normal Capillary Refill


Skin: Reports: Warm, Dry, Intact

## 2022-03-23 ENCOUNTER — HOSPITAL ENCOUNTER (EMERGENCY)
Dept: HOSPITAL 60 - LB.ED | Age: 47
Discharge: HOME | End: 2022-03-23
Payer: MEDICARE

## 2022-03-23 VITALS — HEART RATE: 75 BPM | DIASTOLIC BLOOD PRESSURE: 92 MMHG | SYSTOLIC BLOOD PRESSURE: 132 MMHG

## 2022-03-23 DIAGNOSIS — K21.9: ICD-10-CM

## 2022-03-23 DIAGNOSIS — Z88.8: ICD-10-CM

## 2022-03-23 DIAGNOSIS — T37.3X5A: ICD-10-CM

## 2022-03-23 DIAGNOSIS — Z79.899: ICD-10-CM

## 2022-03-23 DIAGNOSIS — Z88.1: ICD-10-CM

## 2022-03-23 DIAGNOSIS — R07.89: Primary | ICD-10-CM

## 2022-03-23 DIAGNOSIS — Z91.041: ICD-10-CM
